# Patient Record
Sex: MALE | Race: OTHER | Employment: FULL TIME | URBAN - METROPOLITAN AREA
[De-identification: names, ages, dates, MRNs, and addresses within clinical notes are randomized per-mention and may not be internally consistent; named-entity substitution may affect disease eponyms.]

---

## 2020-06-29 ENCOUNTER — OFFICE VISIT (OUTPATIENT)
Dept: PRIMARY CARE CLINIC | Age: 25
End: 2020-06-29

## 2020-06-29 VITALS
HEART RATE: 100 BPM | DIASTOLIC BLOOD PRESSURE: 78 MMHG | OXYGEN SATURATION: 98 % | SYSTOLIC BLOOD PRESSURE: 138 MMHG | RESPIRATION RATE: 18 BRPM

## 2020-06-29 PROCEDURE — 99202 OFFICE O/P NEW SF 15 MIN: CPT | Performed by: NURSE PRACTITIONER

## 2020-06-29 ASSESSMENT — ENCOUNTER SYMPTOMS
VOMITING: 0
SORE THROAT: 1
DIARRHEA: 0
NAUSEA: 0

## 2020-06-29 ASSESSMENT — PATIENT HEALTH QUESTIONNAIRE - PHQ9
2. FEELING DOWN, DEPRESSED OR HOPELESS: 0
SUM OF ALL RESPONSES TO PHQ9 QUESTIONS 1 & 2: 0
SUM OF ALL RESPONSES TO PHQ QUESTIONS 1-9: 0
SUM OF ALL RESPONSES TO PHQ QUESTIONS 1-9: 0
1. LITTLE INTEREST OR PLEASURE IN DOING THINGS: 0

## 2020-06-29 NOTE — PATIENT INSTRUCTIONS
We'll call with COVID-19 results    Things to Know:   - Do not leave home except to get medical care  - Testing does not change treatment--> there is no medication that treats COVID-19  - Get plenty of rest and stay hydrated   - Over the counter pain/ fever reducers such as ibuprofen (aka Motrin/ Advil) or acetaminophen (aka Tylenol) per dosage instructions as needed **please do not take these medications if you have a bleeding disorder, stomach or GI ulcer problems, or liver disease**   - Practice social distancing (maintaining a distance of 6 feet from other persons) when leaving home is necessary.  - Temperature checks twice daily     Please be vigilant in self-monitoring of symptoms and temperature. Symptoms may develop 2 days to 2 weeks following exposure to the virus. If you are exposed after testing, or if you were in the incubation period when tested, you could become ill with COVID-19 later. Keep a low threshold to go to ER or call 9-1-1- for new or suddenly worsening symptoms --> change in nature of cough, high fevers, trouble breathing, persistent vomiting, or have any other emergency warning signs, or if you think it is an emergency. Advance Care Planning  People with COVID-19 may have no symptoms, mild symptoms, such as fever, cough, and shortness of breath or they may have more severe illness, developing severe and fatal pneumonia. As a result, Advance Care Planning with attention to naming a health care decision maker (someone you trust to make healthcare decisions for you if you could not speak for yourself) and sharing other health care preferences is important BEFORE a possible health crisis. Please contact your Primary Care Provider to discuss Advance Care Planning.     Preventing the Spread of Coronavirus Disease 2019 in Homes and Residential Communities  For the most recent information go to [employer]    Prevention steps for People with confirmed or suspected COVID-19 (including persons under investigation) who do not need to be hospitalized  and   People with confirmed COVID-19 who were hospitalized and determined to be medically stable to go home    Your healthcare provider and public health staff will evaluate whether you can be cared for at home. If it is determined that you do not need to be hospitalized and can be isolated at home, you will be monitored by staff from your local or state health department. You should follow the prevention steps below until a healthcare provider or local or state health department says you can return to your normal activities. Stay home except to get medical care  People who are mildly ill with COVID-19 are able to isolate at home during their illness. You should restrict activities outside your home, except for getting medical care. Do not go to work, school, or public areas. Avoid using public transportation, ride-sharing, or taxis. Separate yourself from other people and animals in your home  People: As much as possible, you should stay in a specific room and away from other people in your home. Also, you should use a separate bathroom, if available. Animals: You should restrict contact with pets and other animals while you are sick with COVID-19, just like you would around other people. Although there have not been reports of pets or other animals becoming sick with COVID-19, it is still recommended that people sick with COVID-19 limit contact with animals until more information is known about the virus. When possible, have another member of your household care for your animals while you are sick. If you are sick with COVID-19, avoid contact with your pet, including petting, snuggling, being kissed or licked, and sharing food.  If you must care for your pet or be around animals while you are sick, wash your hands before and after you interact with pets and wear a facemask. Call ahead before visiting your doctor  If you have a medical appointment, call the healthcare provider and tell them that you have or may have COVID-19. This will help the healthcare providers office take steps to keep other people from getting infected or exposed. Wear a facemask  You should wear a facemask when you are around other people (e.g., sharing a room or vehicle) or pets and before you enter a healthcare providers office. If you are not able to wear a facemask (for example, because it causes trouble breathing), then people who live with you should not stay in the same room with you, or they should wear a facemask if they enter your room. Cover your coughs and sneezes  Cover your mouth and nose with a tissue when you cough or sneeze. Throw used tissues in a lined trash can. Immediately wash your hands with soap and water for at least 20 seconds or, if soap and water are not available, clean your hands with an alcohol-based hand  that contains at least 60% alcohol. Clean your hands often  Wash your hands often with soap and water for at least 20 seconds, especially after blowing your nose, coughing, or sneezing; going to the bathroom; and before eating or preparing food. If soap and water are not readily available, use an alcohol-based hand  with at least 60% alcohol, covering all surfaces of your hands and rubbing them together until they feel dry. Soap and water are the best option if hands are visibly dirty. Avoid touching your eyes, nose, and mouth with unwashed hands. Avoid sharing personal household items  You should not share dishes, drinking glasses, cups, eating utensils, towels, or bedding with other people or pets in your home. After using these items, they should be washed thoroughly with soap and water.     Clean all high-touch surfaces everyday  High touch surfaces include counters, tabletops, doorknobs, bathroom fixtures, toilets, phones, keyboards, tablets, and bedside tables. Also, clean any surfaces that may have blood, stool, or body fluids on them. Use a household cleaning spray or wipe, according to the label instructions. Labels contain instructions for safe and effective use of the cleaning product including precautions you should take when applying the product, such as wearing gloves and making sure you have good ventilation during use of the product. Monitor your symptoms  Seek prompt medical attention if your illness is worsening (e.g., difficulty breathing). Before seeking care, call your healthcare provider and tell them that you have, or are being evaluated for, COVID-19. Put on a facemask before you enter the facility. These steps will help the healthcare providers office to keep other people in the office or waiting room from getting infected or exposed. Ask your healthcare provider to call the local or state health department. Persons who are placed under active monitoring or facilitated self-monitoring should follow instructions provided by their local health department or occupational health professionals, as appropriate. When working with your local health department check their available hours. If you have a medical emergency and need to call 911, notify the dispatch personnel that you have, or are being evaluated for COVID-19. If possible, put on a facemask before emergency medical services arrive. Discontinuing home isolation  Patients with confirmed COVID-19 should remain under home isolation precautions until the risk of secondary transmission to others is thought to be low. The decision to discontinue home isolation precautions should be made on a case-by-case basis, in consultation with healthcare providers and state and local health departments.

## 2020-06-29 NOTE — PROGRESS NOTES
[address]  [address] [place] [address] Drive, [address] [address] [facility] [address]  Dept: [phone]    SUBJECTIVE:     Kelsey Zepeda is a 21 y.o. male who presents on 6/29/2020 with:    HPI:  Chief Complaint   Patient presents with    Pharyngitis     patient has had a sore throat for 2 days now, but patient states that it doesn't hurt any more. Respiratory Symptoms:   Rancho Connors was self-referred to [facility] w/ complaint of: exposure to COVID-19 and sore throat    Pt states symptoms have been present x 2 days and have progressed since onset. Other associated symptoms include: none. Treatment to date: none. Pt denies: cough, fever/chills, dyspnea, wheezing, chest tightness, rhinorrhea, HA, N/V/D. Denies anosmia/ dysgeusia. Relevant PMH: no pertinent PMH. Smoking history: current daily smoker. never used smokeless tobacco.  Travel screen completed: yes- no recent travel. Pt reports his wife tested positive for COVID-19 3d ago in the ED. Pt works at [employer]-- states he is on mandatory quarantine/ leave from work 2/2 his wife testing + for covid. Pt denies recent close exposure to someone w/ proven strep pharyngitis. Review of Systems   Constitutional: Negative for chills, fatigue and fever. HENT: Positive for sore throat. Negative for ear pain, postnasal drip, rhinorrhea, sneezing and trouble swallowing. Respiratory: Negative for cough, shortness of breath and wheezing. Cardiovascular: Negative for chest pain and palpitations. Gastrointestinal: Negative for diarrhea, nausea and vomiting. Musculoskeletal: Negative for myalgias. Skin: Negative for rash. Neurological: Negative for dizziness, light-headedness and headaches. OBJECTIVE:   VITALS:    Vitals:    06/29/20 1252   BP: 138/78   Site: Left Upper Arm   Position: Sitting   Cuff Size: Small Adult   Pulse: 100   Resp: 18   SpO2: 98%      Physical Exam  Vitals signs reviewed.    Constitutional:       Appearance: He is well-groomed. He is not toxic-appearing. HENT:      Head: Normocephalic and atraumatic. Jaw: There is normal jaw occlusion. No trismus. Right Ear: External ear normal. No tenderness. Left Ear: External ear normal. No tenderness. Nose: Nose normal. No rhinorrhea. Mouth/Throat:      Lips: Pink. Mouth: Mucous membranes are moist. No oral lesions. Pharynx: Uvula midline. Posterior oropharyngeal erythema present. No pharyngeal swelling or oropharyngeal exudate. Eyes:      General: Lids are normal. Gaze aligned appropriately. Extraocular Movements: Extraocular movements intact. Conjunctiva/sclera: Conjunctivae normal.      Pupils: Pupils are equal, round, and reactive to light. Neck:      Musculoskeletal: Normal range of motion and neck supple. Thyroid: No thyroid tenderness. Vascular: No JVD. Trachea: Trachea and phonation normal.   Cardiovascular:      Rate and Rhythm: Normal rate and regular rhythm. Heart sounds: Normal heart sounds. No murmur. No friction rub. Pulmonary:      Effort: Pulmonary effort is normal. No tachypnea. Breath sounds: Normal breath sounds and air entry. No wheezing or rales. Musculoskeletal: Normal range of motion. Lymphadenopathy:      Cervical: No cervical adenopathy. Skin:     General: Skin is warm and dry. Capillary Refill: Capillary refill takes less than 2 seconds. Findings: No rash. Neurological:      General: No focal deficit present. Mental Status: He is alert and oriented to person, place, and time. Mental status is at baseline. Motor: Motor function is intact. Gait: Gait is intact. Psychiatric:         Mood and Affect: Mood and affect normal.         Speech: Speech normal.         Behavior: Behavior normal. Behavior is cooperative. Thought Content: Thought content normal.     TESTS:  [x] COVID-19 specimen was collected via oropharyngeal route and sent to lab.     POCT RAPID STREP A:  [] Positive     []Negative  [x]Not performed     ASSESSMENT & PLAN:   21 y.o. male presenting w/ two days of sore throat and exposure to COVID-19 via household member. Oropharyngeal swab for SARS-CoV-2 viral RNA test was submitted. Discussed strict self-isolation at home pending results w/ supportive care and self-monitoring for new/ worsening sx's. Diagnoses and all orders for this visit:     Diagnosis Orders   1. Close exposure to COVID-19 virus  COVID-19 Ambulatory   2. Acute sore throat         Pt was sent home w/ written instructions for:  Possible COVID-19 infection w/ self-quarantine at home and management of symptoms  Avoid close contact w/ household members where possible  Do not leave home except to get medical care  Frequent hand washing, cover coughs/ sneezes, disinfect frequently touched surfaces daily  Maintain adequate rest and hydration  OTC NSAIDs/ acetaminophen prn fever/ myalgias   Avoid exposure to environmental irritants where possible  Self-monitoring for worsening symptoms and temp. checks BID    Follow-up w/ PCP or [facility] prn if symptoms are not improving as anticipated. Discussed symptomatic treatments, push fluids, OTC analgesics/ throat lozenges prn for sore throat. Return/ see PCP if no improvement or worsening in 1 week--> ED for difficulty swallowing/ unable to swallow, persistent high fevers    Pt is in agreement w/ this care plan. All questions answered. EVELIA Diallo - CNP  6/29/20     This visit was provided as a focused evaluation during the COVID -19 pandemic/national emergency. A comprehensive review of all previous patient history and testing was not conducted. Pertinent findings were elicited during the visit.

## 2020-07-08 ASSESSMENT — ENCOUNTER SYMPTOMS
TROUBLE SWALLOWING: 0
RHINORRHEA: 0
WHEEZING: 0
COUGH: 0
SHORTNESS OF BREATH: 0

## 2020-12-05 ENCOUNTER — HOSPITAL ENCOUNTER (EMERGENCY)
Age: 25
Discharge: HOME | End: 2020-12-05

## 2020-12-05 VITALS
HEART RATE: 81 BPM | WEIGHT: 145 LBS | BODY MASS INDEX: 24.16 KG/M2 | TEMPERATURE: 98.9 F | SYSTOLIC BLOOD PRESSURE: 150 MMHG | DIASTOLIC BLOOD PRESSURE: 91 MMHG | OXYGEN SATURATION: 99 % | HEIGHT: 65 IN | RESPIRATION RATE: 18 BRPM

## 2020-12-05 PROCEDURE — 6370000000 HC RX 637 (ALT 250 FOR IP): Performed by: PHYSICIAN ASSISTANT

## 2020-12-05 PROCEDURE — 87591 N.GONORRHOEAE DNA AMP PROB: CPT

## 2020-12-05 PROCEDURE — 99284 EMERGENCY DEPT VISIT MOD MDM: CPT

## 2020-12-05 PROCEDURE — 87661 TRICHOMONAS VAGINALIS AMPLIF: CPT

## 2020-12-05 PROCEDURE — 2580000003 HC RX 258

## 2020-12-05 PROCEDURE — 96372 THER/PROPH/DIAG INJ SC/IM: CPT

## 2020-12-05 PROCEDURE — 6360000002 HC RX W HCPCS: Performed by: PHYSICIAN ASSISTANT

## 2020-12-05 PROCEDURE — 87491 CHLMYD TRACH DNA AMP PROBE: CPT

## 2020-12-05 RX ORDER — AZITHROMYCIN 250 MG/1
1000 TABLET, FILM COATED ORAL ONCE
Status: COMPLETED | OUTPATIENT
Start: 2020-12-05 | End: 2020-12-05

## 2020-12-05 RX ORDER — METRONIDAZOLE 500 MG/1
2000 TABLET ORAL ONCE
Status: COMPLETED | OUTPATIENT
Start: 2020-12-05 | End: 2020-12-05

## 2020-12-05 RX ORDER — CEFTRIAXONE SODIUM 250 MG/1
250 INJECTION, POWDER, FOR SOLUTION INTRAMUSCULAR; INTRAVENOUS ONCE
Status: COMPLETED | OUTPATIENT
Start: 2020-12-05 | End: 2020-12-05

## 2020-12-05 RX ADMIN — AZITHROMYCIN MONOHYDRATE 1000 MG: 250 TABLET ORAL at 13:59

## 2020-12-05 RX ADMIN — WATER 0.9 ML: 1 INJECTION INTRAMUSCULAR; INTRAVENOUS; SUBCUTANEOUS at 13:58

## 2020-12-05 RX ADMIN — METRONIDAZOLE 2000 MG: 500 TABLET ORAL at 13:58

## 2020-12-05 RX ADMIN — CEFTRIAXONE SODIUM 250 MG: 250 INJECTION, POWDER, FOR SOLUTION INTRAMUSCULAR; INTRAVENOUS at 13:58

## 2020-12-05 ASSESSMENT — ENCOUNTER SYMPTOMS
EYES NEGATIVE: 1
RESPIRATORY NEGATIVE: 1
GASTROINTESTINAL NEGATIVE: 1

## 2020-12-05 NOTE — ED PROVIDER NOTES
[address] [facility] ED  eMERGENCY dEPARTMENT eNCOUnter      Pt Name: Tessie Denson  MRN: [id number]  [place] 1995  Date of evaluation: 12/5/2020  Provider: Leanne Esquivel PA-C      HISTORY OF PRESENT ILLNESS    Tessie Denson is a 21 y.o. male who presents to the Emergency Department with chief complaint of burning with urination and penile discharge. Patient wife states that he was in [facility] a month ago and had unprotected sexual encounter in 3 weeks ago patient developed burning with urination and discharge. Patient denies any abdominal pain or fever. He has no other concerns at this time. REVIEW OF SYSTEMS       Review of Systems   Constitutional: Negative. HENT: Negative. Eyes: Negative. Respiratory: Negative. Cardiovascular: Negative. Gastrointestinal: Negative. Endocrine: Negative. Genitourinary: Positive for discharge and dysuria. Musculoskeletal: Negative. Neurological: Negative. Psychiatric/Behavioral: Negative. PAST MEDICAL HISTORY   History reviewed. No pertinent past medical history. SURGICAL HISTORY     History reviewed. No pertinent surgical history. CURRENT MEDICATIONS     There are no discharge medications for this patient. ALLERGIES     Patient has no known allergies. FAMILY HISTORY     History reviewed. No pertinent family history. SOCIAL HISTORY       Social History     Socioeconomic History    Marital status:      Spouse name: None    Number of children: None    Years of education: None    Highest education level: None   Occupational History    None   Social Needs    Financial resource strain: None    Food insecurity     Worry: None     Inability: None    Transportation needs     Medical: None     Non-medical: None   Tobacco Use    Smoking status: Current Every Day Smoker    Smokeless tobacco: Never Used   Substance and Sexual Activity    Alcohol use:  Yes    Drug use: Never    Sexual activity: None   Lifestyle    Physical activity     Days per week: None     Minutes per session: None    Stress: None   Relationships    Social connections     Talks on phone: None     Gets together: None     Attends [employer] service: None     Active member of club or organization: None     Attends meetings of clubs or organizations: None     Relationship status: None    Intimate partner violence     Fear of current or ex partner: None     Emotionally abused: None     Physically abused: None     Forced sexual activity: None   Other Topics Concern    None   Social History Narrative    None       SCREENINGS    [place] Coma Scale  Eye Opening: Spontaneous  Best Verbal Response: Oriented  Best Motor Response: Obeys commands  [place] Coma Scale Score: 15 @FLOW(16900120)@      PHYSICAL EXAM    (up to 7 for level 4, 8 or more for level 5)     ED Triage Vitals [12/05/20 1249]   BP Temp Temp src Pulse Resp SpO2 Height Weight   (!) 150/91 98.9 °F (37.2 °C) -- 81 18 99 % 5' 5\" (1.651 m) 145 lb (65.8 kg)       Physical Exam  Constitutional:       General: He is not in acute distress. Appearance: He is well-developed. HENT:      Head: Normocephalic and atraumatic. Eyes:      Conjunctiva/sclera: Conjunctivae normal.      Pupils: Pupils are equal, round, and reactive to light. Neck:      Musculoskeletal: Normal range of motion and neck supple. Cardiovascular:      Rate and Rhythm: Normal rate and regular rhythm. Heart sounds: No murmur. Pulmonary:      Effort: No respiratory distress. Breath sounds: Normal breath sounds. No wheezing or rales. Abdominal:      General: There is no distension. Palpations: Abdomen is soft. Tenderness: There is no abdominal tenderness. Genitourinary:     Comments: Exam deferred   Musculoskeletal: Normal range of motion. Skin:     General: Skin is warm and dry. Findings: No erythema or rash.    Neurological:      Mental Status: He is alert and oriented to person, place, and time. Cranial Nerves: No cranial nerve deficit. Psychiatric:         Judgment: Judgment normal.           All other labs were within normal range or not returned as of this dictation. EMERGENCY DEPARTMENT COURSE and DIFFERENTIALDIAGNOSIS/MDM:   Vitals:    Vitals:    12/05/20 1249   BP: (!) 150/91   Pulse: 81   Resp: 18   Temp: 98.9 °F (37.2 °C)   SpO2: 99%   Weight: 145 lb (65.8 kg)   Height: 5' 5\" (1.651 m)            Patient started on Rocephin, Zithromax, and Flagyl for treatment while in the emergency room. Patient to avoid all sexual activity for at least 2 weeks. Follow-up with Dr. Klaudia Abdul in 1 week if symptoms persist.  Return here if symptoms worsen or if new concerning symptoms arise. Patient understands plan at discharge. PROCEDURES:  Unless otherwise noted below, none     Procedures      FINAL IMPRESSION      1.  Urethritis          DISPOSITION/PLAN   DISPOSITION Decision To Discharge 12/05/2020 02:28:13 PM          Nancy Randle PA-C (electronically signed)  Attending Emergency Physician  [address] [facility], [place]  12/05/20 1317

## 2020-12-05 NOTE — ED NOTES
Discharge instructions provided in [place] [facility] [place]       Joya Sever, RN  12/05/20 5754

## 2020-12-09 LAB
C. TRACHOMATIS DNA ,URINE: NEGATIVE
N. GONORRHOEAE DNA, URINE: NEGATIVE
SPECIMEN SOURCE: NORMAL
T. VAGINALIS AMPLIFIED: NEGATIVE

## 2025-05-07 ENCOUNTER — HOSPITAL ENCOUNTER (EMERGENCY)
Age: 30
Discharge: HOME | End: 2025-05-07
Attending: STUDENT IN AN ORGANIZED HEALTH CARE EDUCATION/TRAINING PROGRAM

## 2025-05-07 ENCOUNTER — APPOINTMENT (OUTPATIENT)
Dept: CT IMAGING | Age: 30
End: 2025-05-07

## 2025-05-07 ENCOUNTER — APPOINTMENT (OUTPATIENT)
Dept: GENERAL RADIOLOGY | Age: 30
End: 2025-05-07

## 2025-05-07 VITALS
HEART RATE: 74 BPM | TEMPERATURE: 98.2 F | BODY MASS INDEX: 29.32 KG/M2 | SYSTOLIC BLOOD PRESSURE: 163 MMHG | HEIGHT: 65 IN | WEIGHT: 176 LBS | RESPIRATION RATE: 17 BRPM | DIASTOLIC BLOOD PRESSURE: 114 MMHG | OXYGEN SATURATION: 98 %

## 2025-05-07 DIAGNOSIS — F41.1 ANXIETY STATE: ICD-10-CM

## 2025-05-07 DIAGNOSIS — I10 ESSENTIAL HYPERTENSION: ICD-10-CM

## 2025-05-07 DIAGNOSIS — R00.2 PALPITATIONS: Primary | ICD-10-CM

## 2025-05-07 LAB
ALBUMIN SERPL-MCNC: 4.7 G/DL (ref 3.5–4.6)
ALP SERPL-CCNC: 75 U/L (ref 35–104)
ALT SERPL-CCNC: 20 U/L (ref 0–41)
ANION GAP SERPL CALCULATED.3IONS-SCNC: 12 MEQ/L (ref 9–15)
AST SERPL-CCNC: 19 U/L (ref 0–40)
BASOPHILS # BLD: 0 K/UL (ref 0–0.2)
BASOPHILS NFR BLD: 0.6 %
BILIRUB SERPL-MCNC: 0.4 MG/DL (ref 0.2–0.7)
BUN SERPL-MCNC: 15 MG/DL (ref 6–20)
CALCIUM SERPL-MCNC: 9.4 MG/DL (ref 8.5–9.9)
CHLORIDE SERPL-SCNC: 102 MEQ/L (ref 95–107)
CO2 SERPL-SCNC: 25 MEQ/L (ref 20–31)
CREAT SERPL-MCNC: 1.23 MG/DL (ref 0.7–1.2)
EOSINOPHIL # BLD: 0.2 K/UL (ref 0–0.7)
EOSINOPHIL NFR BLD: 3.5 %
ERYTHROCYTE [DISTWIDTH] IN BLOOD BY AUTOMATED COUNT: 12.7 % (ref 11.5–14.5)
GLOBULIN SER CALC-MCNC: 2.6 G/DL (ref 2.3–3.5)
GLUCOSE SERPL-MCNC: 105 MG/DL (ref 70–99)
HCT VFR BLD AUTO: 46.3 % (ref 42–52)
HGB BLD-MCNC: 15.7 G/DL (ref 14–18)
LIPASE SERPL-CCNC: 21 U/L (ref 12–95)
LYMPHOCYTES # BLD: 2.2 K/UL (ref 1–4.8)
LYMPHOCYTES NFR BLD: 32.8 %
MCH RBC QN AUTO: 29 PG (ref 27–31.3)
MCHC RBC AUTO-ENTMCNC: 33.9 % (ref 33–37)
MCV RBC AUTO: 85.6 FL (ref 79–92.2)
MONOCYTES # BLD: 0.4 K/UL (ref 0.2–0.8)
MONOCYTES NFR BLD: 5.4 %
NEUTROPHILS # BLD: 3.8 K/UL (ref 1.4–6.5)
NEUTS SEG NFR BLD: 57.2 %
PLATELET # BLD AUTO: 174 K/UL (ref 130–400)
POTASSIUM SERPL-SCNC: 3.8 MEQ/L (ref 3.4–4.9)
PROT SERPL-MCNC: 7.3 G/DL (ref 6.3–8)
RBC # BLD AUTO: 5.41 M/UL (ref 4.7–6.1)
SODIUM SERPL-SCNC: 139 MEQ/L (ref 135–144)
TROPONIN, HIGH SENSITIVITY: <6 NG/L (ref 0–19)
WBC # BLD AUTO: 6.6 K/UL (ref 4.8–10.8)

## 2025-05-07 PROCEDURE — 80053 COMPREHEN METABOLIC PANEL: CPT

## 2025-05-07 PROCEDURE — 93005 ELECTROCARDIOGRAM TRACING: CPT | Performed by: STUDENT IN AN ORGANIZED HEALTH CARE EDUCATION/TRAINING PROGRAM

## 2025-05-07 PROCEDURE — 83690 ASSAY OF LIPASE: CPT

## 2025-05-07 PROCEDURE — 6370000000 HC RX 637 (ALT 250 FOR IP): Performed by: STUDENT IN AN ORGANIZED HEALTH CARE EDUCATION/TRAINING PROGRAM

## 2025-05-07 PROCEDURE — 71045 X-RAY EXAM CHEST 1 VIEW: CPT

## 2025-05-07 PROCEDURE — 6360000002 HC RX W HCPCS: Performed by: STUDENT IN AN ORGANIZED HEALTH CARE EDUCATION/TRAINING PROGRAM

## 2025-05-07 PROCEDURE — 99285 EMERGENCY DEPT VISIT HI MDM: CPT

## 2025-05-07 PROCEDURE — 2580000003 HC RX 258: Performed by: STUDENT IN AN ORGANIZED HEALTH CARE EDUCATION/TRAINING PROGRAM

## 2025-05-07 PROCEDURE — 36415 COLL VENOUS BLD VENIPUNCTURE: CPT

## 2025-05-07 PROCEDURE — 96374 THER/PROPH/DIAG INJ IV PUSH: CPT

## 2025-05-07 PROCEDURE — 84484 ASSAY OF TROPONIN QUANT: CPT

## 2025-05-07 PROCEDURE — 85025 COMPLETE CBC W/AUTO DIFF WBC: CPT

## 2025-05-07 RX ORDER — MAGNESIUM HYDROXIDE/ALUMINUM HYDROXICE/SIMETHICONE 120; 1200; 1200 MG/30ML; MG/30ML; MG/30ML
30 SUSPENSION ORAL ONCE
Status: COMPLETED | OUTPATIENT
Start: 2025-05-07 | End: 2025-05-07

## 2025-05-07 RX ORDER — PANTOPRAZOLE SODIUM 20 MG/1
20 TABLET, DELAYED RELEASE ORAL DAILY
Qty: 30 TABLET | Refills: 3 | Status: SHIPPED | OUTPATIENT
Start: 2025-05-07

## 2025-05-07 RX ORDER — AMLODIPINE BESYLATE 2.5 MG/1
2.5 TABLET ORAL DAILY
Qty: 30 TABLET | Refills: 3 | Status: SHIPPED | OUTPATIENT
Start: 2025-05-07

## 2025-05-07 RX ORDER — AMLODIPINE BESYLATE 5 MG/1
5 TABLET ORAL ONCE
Status: COMPLETED | OUTPATIENT
Start: 2025-05-07 | End: 2025-05-07

## 2025-05-07 RX ADMIN — SODIUM CHLORIDE 40 MG: 9 INJECTION INTRAMUSCULAR; INTRAVENOUS; SUBCUTANEOUS at 19:43

## 2025-05-07 RX ADMIN — ALUMINUM HYDROXIDE, MAGNESIUM HYDROXIDE, AND SIMETHICONE 30 ML: 200; 200; 20 SUSPENSION ORAL at 19:46

## 2025-05-07 RX ADMIN — AMLODIPINE BESYLATE 5 MG: 5 TABLET ORAL at 20:34

## 2025-05-07 ASSESSMENT — LIFESTYLE VARIABLES
HOW OFTEN DO YOU HAVE A DRINK CONTAINING ALCOHOL: MONTHLY OR LESS
HOW MANY STANDARD DRINKS CONTAINING ALCOHOL DO YOU HAVE ON A TYPICAL DAY: 3 OR 4

## 2025-05-07 ASSESSMENT — PAIN - FUNCTIONAL ASSESSMENT: PAIN_FUNCTIONAL_ASSESSMENT: NONE - DENIES PAIN

## 2025-05-07 NOTE — ED PROVIDER NOTES
[facility] EMERGENCY DEPARTMENT  EMERGENCY DEPARTMENT ENCOUNTER      Pt Name: Ramy Darnell  MRN: [id number]  Birthdate 1995  Date of evaluation: 5/7/2025  Provider: Danny Vaughn MD  1:23 AM    CHIEF COMPLAINT       Chief Complaint   Patient presents with    Anxiety     States his ears are warm, feels anxious and has heart palpations          HISTORY OF PRESENT ILLNESS    Ramy Darnell is a 29 y.o. male who presents to the emergency department Palpitations, ears feeling warm, forehead pressure, epigastric discomfort      HPI  Patient is a 29-year-old male presenting to the ED due to concern for feeling anxious, palpitations, epigastric discomfort, both of his ears feeling warm.  He endorsed that he had intermittent episodes of this for the past 5 days.  Today, at around 4:25 PM, he was at work washing his hands when all of the symptoms occurred.  He endorsed that he got palpitations, no chest pain, no lightheadedness or vertigo.  He also had some pressure to the front of his head.  No vision changes.  No shortness of breath.  He endorsed that it lasted for about 10 minutes.  He endorses that he smokes cigarettes, does not drink caffeine, no methamphetamine or opiate usage.  He endorsed that he tried cocaine a month ago but has not done it ever since.  He denies any history of anxiety being diagnosed.  He endorsed also some epigastric discomfort that occurs after eating, particularly, he feels bloated.    Nursing Notes were reviewed.    REVIEW OF SYSTEMS       Review of Systems   Constitutional:  Negative for activity change, appetite change, chills, diaphoresis, fatigue and fever.   HENT:  Negative for congestion, postnasal drip, rhinorrhea and sinus pain.    Eyes:  Negative for pain, discharge, redness and itching.   Respiratory:  Negative for cough, chest tightness and shortness of breath.    Cardiovascular:  Positive for palpitations. Negative for chest pain and leg swelling.   Gastrointestinal:  Negative    Vitals:    Vitals:    05/07/25 2000 05/07/25 2030 05/07/25 2034 05/07/25 2100   BP: (!) 158/100 (!) 152/100 (!) 167/114 (!) 163/114   Pulse: 73 70 81 74   Resp: 14 (!) 8 17 17   Temp:       TempSrc:       SpO2: 98% 99% 99% 98%   Weight:       Height:               Medical Decision Making  Amount and/or Complexity of Data Reviewed  Labs: ordered.  Radiology: ordered.  ECG/medicine tests: ordered.    Risk  OTC drugs.  Prescription drug management.      Patient is a 29-year-old male presenting to the ED due to concern for feeling anxious, palpitations epigastric discomfort and his ears feeling warm    With initial presentation, patient was not in immediate distress.  He was afebrile.  He was not tachycardic or hypotensive.  He was not tachypneic or hypoxic.  Patient endorsed that at 4:25 PM today, he developed all of the symptoms previously mentioned above.  He denied having any chest pain but did have the palpitations.  He denied any lightheadedness or vertigo.  He did have some pressure to the front of his head.  He denied any vision changes.  No shortness of breath.  This all occurred while he was washing his hands at work.  He endorses that he smokes about 5 cigarettes a day, does not drink caffeine, no methamphetamine usage.  He endorsed that he used cocaine about a month ago but that was an isolated event.    On my exam, his lungs sounded clear without any wheezing, crackles or rales and his heart sounded regular without any murmurs, gallops or rubs.  Bilateral pupils are round reactive to light and equal bilaterally.  He had no focal logical deficits on exam.  Speech was clear.  No cranial nerve deficits grossly.  He had 5/5 strength with bilateral upper and lower extremities.     I obtained an EKG which was normal sinus rhythm with no acute ST abnormality or obvious arrhythmia.  I gave patient Protonix and Maalox which she endorsed improved his epigastric discomfort.  In regards to his epigastric discomfort,

## 2025-05-07 NOTE — ED NOTES
Pt reports feeling anxious while at work today. Pt states he felt his heart racing, his ears got hot, and he had a headache. Pt denies chest pain/dizziness.   Dr. Vaughn at bedside assessing pt.   Assessment completed using .

## 2025-05-08 LAB
EKG ATRIAL RATE: 83 BPM
EKG DIAGNOSIS: NORMAL
EKG P AXIS: 60 DEGREES
EKG P-R INTERVAL: 152 MS
EKG Q-T INTERVAL: 342 MS
EKG QRS DURATION: 84 MS
EKG QTC CALCULATION (BAZETT): 401 MS
EKG R AXIS: 60 DEGREES
EKG T AXIS: 47 DEGREES
EKG VENTRICULAR RATE: 83 BPM

## 2025-05-08 NOTE — ED NOTES
Pt states he does not want a CT of his head at this time. Provider aware.  used to communicate with pt.

## 2025-05-08 NOTE — DISCHARGE INSTRUCTIONS
You were seen in the ER today due to palpitations, frontal head pressure that has been going on for a few days.  Chest x-ray did not show any signs of lung injury.  EKG shows no signs of heart attack.  Your heart enzyme as troponin is normal.  Your lactate and kidney function is normal.  Her white blood cell count is not elevated and you are not anemic.  We did start a blood pressure medication given her blood pressure was elevated.  I will give you a 1 month supply but it is important that you follow-up with the [facility] to recheck your blood pressure and to continue it.  If develop any lightheadedness, swelling to your legs, severe weakness and fatigue, it could mean that your blood pressure is too low, please come back to the ED for further evaluation and treatment.  In the meantime we will also start you on pantoprazole given that you have some reflux symptoms during the daytime, particularly in the morning.    You did endorse that you used cocaine a month ago, please do not go back into using cocaine again as it can cause to further palpitations and chest discomfort.  You do smoke 5 cigarettes a day which can lead to further issues with cardiac and lung disease in the future.  [facility] dentistry office to have services available to stopping usage of nicotine.

## 2025-05-10 ASSESSMENT — ENCOUNTER SYMPTOMS
SINUS PAIN: 0
CHEST TIGHTNESS: 0
COLOR CHANGE: 0
RHINORRHEA: 0
EYE ITCHING: 0
EYE PAIN: 0
VOMITING: 0
DIARRHEA: 0
NAUSEA: 0
ABDOMINAL PAIN: 0
COUGH: 0
SHORTNESS OF BREATH: 0
EYE DISCHARGE: 0
EYE REDNESS: 0
